# Patient Record
Sex: FEMALE | Race: WHITE | Employment: UNEMPLOYED | URBAN - METROPOLITAN AREA
[De-identification: names, ages, dates, MRNs, and addresses within clinical notes are randomized per-mention and may not be internally consistent; named-entity substitution may affect disease eponyms.]

---

## 2024-02-05 ENCOUNTER — HOSPITAL ENCOUNTER (EMERGENCY)
Age: 59
Discharge: HOME | End: 2024-02-05
Attending: EMERGENCY MEDICINE
Payer: MEDICARE

## 2024-02-05 VITALS
HEIGHT: 66 IN | DIASTOLIC BLOOD PRESSURE: 76 MMHG | OXYGEN SATURATION: 96 % | BODY MASS INDEX: 36.96 KG/M2 | TEMPERATURE: 98.3 F | SYSTOLIC BLOOD PRESSURE: 154 MMHG | WEIGHT: 230 LBS | HEART RATE: 77 BPM | RESPIRATION RATE: 18 BRPM

## 2024-02-05 DIAGNOSIS — U07.1 COVID: Primary | ICD-10-CM

## 2024-02-05 LAB
FLUAV RNA SPEC QL NAA+PROBE: NOT DETECTED
FLUBV RNA SPEC QL NAA+PROBE: NOT DETECTED
SARS-COV-2 RDRP RESP QL NAA+PROBE: DETECTED
SOURCE: ABNORMAL
STREP, MOLECULAR: NOT DETECTED

## 2024-02-05 PROCEDURE — 87635 SARS-COV-2 COVID-19 AMP PRB: CPT

## 2024-02-05 PROCEDURE — 99283 EMERGENCY DEPT VISIT LOW MDM: CPT

## 2024-02-05 PROCEDURE — 87502 INFLUENZA DNA AMP PROBE: CPT

## 2024-02-05 PROCEDURE — 87651 STREP A DNA AMP PROBE: CPT

## 2024-02-05 RX ORDER — BENZONATATE 200 MG/1
200 CAPSULE ORAL 3 TIMES DAILY PRN
Qty: 30 CAPSULE | Refills: 0 | Status: SHIPPED | OUTPATIENT
Start: 2024-02-05 | End: 2024-02-12

## 2024-02-05 ASSESSMENT — ENCOUNTER SYMPTOMS: COUGH: 1

## 2024-02-05 NOTE — DISCHARGE INSTRUCTIONS
We would love to help you get a primary care doctor for follow-up after your emergency department visit.    Please call [phone] between 7AM - 6PM Monday to Friday.  A care navigator will be able to assist you with setting up a doctor close to your home.

## 2024-02-05 NOTE — ED PROVIDER NOTES
Emergency Department Provider Note       PCP: No, Pcp   Age: 58 y.o.   Sex: female     DISPOSITION Decision To Discharge 02/05/2024 10:01:25 AM       ICD-10-CM    1. COVID  U07.1           Medical Decision Making     Complexity of Problems Addressed:  1 or more acute illnesses that pose a threat to life or bodily function.     Data Reviewed and Analyzed:  I independently ordered and reviewed each unique test.  I reviewed external records: provider visit note from PCP.           Discussion of management or test interpretation.    Patient is a 58-year-old female who presents with bodyaches and cough and congestion for the last 2 weeks.  Patient also has had a sore throat.  Patient denies any shortness of breath or wheezing and denies any lower extremity edema.  Patient has had sick contacts who had flulike symptoms.  Differential diagnosis includes was not limited to COVID, flu, strep, viral syndrome.    Patient's physical exam is unremarkable and lungs are clear to auscultation bilaterally.  Patient is ANO x 4.  No respiratory distress.  Patient's COVID test is positive and negative strep and flu.  We will treat with Paxlovid as well as Tessalon Perles and have patient follow-up.  Patient's O2 sat is 96 to 98% on room air and respiratory rate of 18 which is reassuring no evidence of acute respiratory distress.  All questions answered.            Risk of Complications and/or Morbidity of Patient Management:  Prescription drug management performed.  Shared medical decision making was utilized in creating the patients health plan today.        History       Patient is a 58-year-old female who presents with bodyaches and cough and congestion for the last 2 weeks.  Patient also has had a sore throat.  Patient denies any shortness of breath or wheezing and denies any lower extremity edema.  Patient has had sick contacts who had flulike symptoms.         Review of Systems   Constitutional:  Positive for chills and fever.

## 2024-02-05 NOTE — FLOWSHEET NOTE
I have reviewed discharge instructions with the patient.  The patient verbalized understanding.    Patient left ED via Discharge Method: ambulatory to Home with family.    Opportunity for questions and clarification provided.       Patient given 2 scripts.         To continue your aftercare when you leave the hospital, you may receive an automated call from our care team to check in on how you are doing.  This is a free service and part of our promise to provide the best care and service to meet your aftercare needs.” If you have questions, or wish to unsubscribe from this service please call [phone].  Thank you for Choosing our [facility] Emergency Department.